# Patient Record
Sex: FEMALE | Race: WHITE
[De-identification: names, ages, dates, MRNs, and addresses within clinical notes are randomized per-mention and may not be internally consistent; named-entity substitution may affect disease eponyms.]

---

## 2021-02-27 ENCOUNTER — HOSPITAL ENCOUNTER (EMERGENCY)
Dept: HOSPITAL 43 - DL.ED | Age: 86
LOS: 1 days | Discharge: HOME | End: 2021-02-28
Payer: MEDICAID

## 2021-02-27 DIAGNOSIS — Z88.0: ICD-10-CM

## 2021-02-27 DIAGNOSIS — E03.9: ICD-10-CM

## 2021-02-27 DIAGNOSIS — I10: ICD-10-CM

## 2021-02-27 DIAGNOSIS — Z91.14: ICD-10-CM

## 2021-02-27 DIAGNOSIS — Z79.01: ICD-10-CM

## 2021-02-27 DIAGNOSIS — R04.0: Primary | ICD-10-CM

## 2021-02-27 LAB
ANION GAP SERPL CALC-SCNC: 12.6 MEQ/L (ref 7–13)
CHLORIDE SERPL-SCNC: 103 MMOL/L (ref 98–107)
SODIUM SERPL-SCNC: 140 MMOL/L (ref 136–145)

## 2021-02-27 PROCEDURE — 85610 PROTHROMBIN TIME: CPT

## 2021-02-27 PROCEDURE — 85025 COMPLETE CBC W/AUTO DIFF WBC: CPT

## 2021-02-27 PROCEDURE — 84443 ASSAY THYROID STIM HORMONE: CPT

## 2021-02-27 PROCEDURE — 36415 COLL VENOUS BLD VENIPUNCTURE: CPT

## 2021-02-27 PROCEDURE — 80053 COMPREHEN METABOLIC PANEL: CPT

## 2021-02-27 PROCEDURE — 99283 EMERGENCY DEPT VISIT LOW MDM: CPT

## 2021-02-27 NOTE — EDM.PDOC
ED HPI GENERAL MEDICAL PROBLEM





- General


Chief Complaint: ENT Problem


Stated Complaint: NOSE BLEED 45 MINUTES WONT STOP


Time Seen by Provider: 02/27/21 22:40


Source of Information: Reports: Patient, RN


History Limitations: Reports: No Limitations





- History of Present Illness


INITIAL COMMENTS - FREE TEXT/NARRATIVE: 





ED with c/o nose bleed, started approximately 45 minutes prior after "picking 

dried booger". No hx of nose bleeds, not on anticoagulants. has not been seen in

[facility] recently. Denies any home medications, No refills on medications and 

doesn't want to go where there are sick people. 





- Related Data


                                    Allergies











Allergy/AdvReac Type Severity Reaction Status Date / Time


 


Penicillins Allergy  Hives Verified 12/12/17 13:22














Past Medical History


HEENT History: Reports: Epistaxis, Hard of Hearing


Cardiovascular History: Reports: Hypertension


Endocrine/Metabolic History: Reports: Hypothyroidism





- Past Surgical History


Endocrine Surgical History: Reports: None





Social & Family History





- Family History


Family Medical History: No Pertinent Family History





- Tobacco Use


Tobacco Use Status *Q: Unknown Ever Used Tobacco


Second Hand Smoke Exposure: No





- Caffeine Use


Caffeine Use: Reports: Soda





- Recreational Drug Use


Recreational Drug Use: No





ED ROS ENT





- Review of Systems


Review Of Systems: Comprehensive ROS is negative, except as noted in HPI.





ED EXAM, ENT





- Physical Exam


Exam: See Below


Exam Limited By: No Limitations


General Appearance: Alert, Anxious, Mild Distress, Obese


Eye Exam: Bilateral Eye: EOMI


Ears: Normal External Exam, Hearing Loss


Nose: Active Bleeding (right nare)


Mouth/Throat: Normal Inspection, Normal Oropharynx


Head: Atraumatic, Normocephalic


Neck: Normal Inspection


Respiratory/Chest: No Respiratory Distress, Lungs Clear, Normal Breath Sounds


Cardiovascular: Normal Peripheral Pulses


Extremities: Normal Inspection


Neurological: Alert, Oriented


Psychiatric: Normal Affect, Normal Mood


Skin: Warm, Dry, Intact





Course





- Vital Signs


Last Recorded V/S: 


                                Last Vital Signs











Temp  98.5 F   02/27/21 22:41


 


Pulse  89   02/27/21 22:41


 


Resp  20   02/27/21 22:41


 


BP  156/133 H  02/27/21 22:41


 


Pulse Ox  99   02/27/21 22:41














- Orders/Labs/Meds


Orders: 


                               Active Orders 24 hr











 Category Date Time Status


 


 Heparin Sodium [Heparin Lock Flush 100 Units/ML] Med  02/27/21 22:58 Active





 500 units FLUSH ASDIRECTED PRN   








                                Medication Orders





Heparin Sodium (Porcine) (Heparin Lock Flush 100 Units/Ml)  500 units FLUSH 

ASDIRECTED PRN


   PRN Reason: Keep Vein Open


   Last Admin: 02/27/21 23:13  Dose: 500 units


   Documented by: GALEN








Labs: 


                                Laboratory Tests











  02/27/21 02/27/21 02/27/21 Range/Units





  23:06 23:06 23:06 


 


WBC  7.6    (5.0-10.0)  10^3/uL


 


RBC  4.14 L    (4.2-5.4)  10^6/uL


 


Hgb  12.6    (12.0-16.0)  g/dL


 


Hct  39.5    (37.0-47.0)  %


 


MCV  95.4    ()  fL


 


MCH  30.4    (27.0-34.0)  pg


 


MCHC  31.9 L    (33.0-35.0)  g/dL


 


Plt Count  272    (150-450)  10^3/uL


 


Neut % (Auto)  61.2    (42.2-75.2)  %


 


Lymph % (Auto)  23.8    (20.5-50.1)  %


 


Mono % (Auto)  9.2 H    (2-8)  %


 


Eos % (Auto)  4.2 H    (1.0-3.0)  %


 


Baso % (Auto)  1.6 H    (0.0-1.0)  %


 


Add Manual Diff  Yes    


 


Neutrophils % (Manual)  63    (42-75)  %


 


Band Neutrophils %  5    %


 


Lymphocytes % (Manual)  20    (20-50)  %


 


Monocytes % (Manual)  10 H    (2-8)  %


 


Eosinophils % (Manual)  2    (1-3)  %


 


PT   10.4   (9.0-12.0)  SEC


 


INR   1.1   (0.9-1.2)  


 


Sodium    140  (136-145)  mmol/L


 


Potassium    3.6  (3.5-5.1)  mmol/L


 


Chloride    103  ()  mmol/L


 


Carbon Dioxide    28  (21-32)  mmol/L


 


Anion Gap    12.6  (7-13)  mEq/L


 


BUN    17  (7-18)  mg/dL


 


Creatinine    0.79  (0.55-1.02)  mg/dL


 


Est Cr Clr Drug Dosing    45.30  mL/min


 


Estimated GFR (MDRD)    > 60  


 


BUN/Creatinine Ratio    21.5  (No establ ref range)  


 


Glucose    174 H  (74-99)  mg/dL


 


Calcium    8.6  (8.5-10.1)  mg/dL


 


Total Bilirubin    0.4  (0.2-1.0)  mg/dL


 


AST    14 L  (15-37)  U/L


 


ALT    17  (14-59)  U/L


 


Alkaline Phosphatase    90  ()  U/L


 


Total Protein    7.3  (6.4-8.2)  g/dL


 


Albumin    3.1 L  (3.4-5.0)  g/dL


 


Globulin    4.2  


 


Albumin/Globulin Ratio    0.74  


 


TSH, Ultra Sensitive    25.90 H  (0.36-3.74)  uIU/mL











Meds: 


Medications











Generic Name Dose Route Start Last Admin





  Trade Name Freq  PRN Reason Stop Dose Admin


 


Heparin Sodium (Porcine)  500 units  02/27/21 22:58  02/27/21 23:13





  Heparin Lock Flush 100 Units/Ml  FLUSH   500 units





  ASDIRECTED PRN   Administration





  Keep Vein Open  














Discontinued Medications














Generic Name Dose Route Start Last Admin





  Trade Name Freq  PRN Reason Stop Dose Admin


 


Oxymetazoline HCl  1 ml  02/27/21 22:40  02/27/21 23:13





  Nasal Decongestant Spray  JODIE  02/27/21 22:41  1 applic





  ONETIME ONE   Administration














- Re-Assessments/Exams


Free Text/Narrative Re-Assessment/Exam: 





02/28/21 00:05


bleeding right nare stopped with pressure.  No further intervention needed. 





Departure





- Departure


Time of Disposition: 23:55


Disposition: Home, Self-Care 01


Condition: Good


Clinical Impression: 


 Epistaxis, Noncompliance w/medication treatment due to intermit use of 

medication





Hypothyroid


Qualifiers:


 Hypothyroidism type: unspecified Qualified Code(s): E03.9 - Hypothyroidism, 

unspecified








- Discharge Information


*PRESCRIPTION DRUG MONITORING PROGRAM REVIEWED*: No


*COPY OF PRESCRIPTION DRUG MONITORING REPORT IN PATIENT DOM: No


Instructions:  Hypothyroidism, Nosebleed, Easy-to-Read


Forms:  ED Department Discharge


Additional Instructions: 


Humidifier


pressure to nasal bridge if bleeding resumes


follow up if unable to stop bleeding with direct pressure for at lest 5 minutes


May use saline nasal spray as needed if dried mucus in nose.


Follow up in clinic next week to refill home medications





Sepsis Event Note (ED)





- Evaluation


Sepsis Screening Result: No Definite Risk





- Focused Exam


Vital Signs: 


                                   Vital Signs











  Temp Pulse Resp BP Pulse Ox


 


 02/27/21 22:41  98.5 F  89  20  156/133 H  99














- My Orders


Last 24 Hours: 


My Active Orders





02/27/21 22:58


Heparin Sodium [Heparin Lock Flush 100 Units/ML]   500 units FLUSH ASDIRECTED 

PRN 














- Assessment/Plan


Last 24 Hours: 


My Active Orders





02/27/21 22:58


Heparin Sodium [Heparin Lock Flush 100 Units/ML]   500 units FLUSH ASDIRECTED 

PRN

## 2022-12-05 ENCOUNTER — HOSPITAL ENCOUNTER (EMERGENCY)
Dept: HOSPITAL 43 - DL.ED | Age: 87
Discharge: NURSING, REHAB OR LONG-TERM CARE | End: 2022-12-05
Payer: MEDICARE
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
## 2022-12-05 DIAGNOSIS — E87.1: ICD-10-CM

## 2022-12-05 DIAGNOSIS — I48.20: ICD-10-CM

## 2022-12-05 DIAGNOSIS — E03.9: ICD-10-CM

## 2022-12-05 DIAGNOSIS — Z88.0: ICD-10-CM

## 2022-12-05 DIAGNOSIS — I10: ICD-10-CM

## 2022-12-05 DIAGNOSIS — S06.5X0A: Primary | ICD-10-CM
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
## 2022-12-05 LAB
ANION GAP SERPL CALC-SCNC: 12.5 MEQ/L (ref 7–13)
APTT PPP: 31.7 SEC (ref 22–34)
CHLORIDE SERPL-SCNC: 93 MMOL/L (ref 98–107)
EGFRCR SERPLBLD CKD-EPI 2021: 51 ML/MIN (ref 60–?)
SODIUM SERPL-SCNC: 126 MMOL/L (ref 136–145)